# Patient Record
Sex: FEMALE | Race: WHITE | NOT HISPANIC OR LATINO | Employment: FULL TIME | URBAN - METROPOLITAN AREA
[De-identification: names, ages, dates, MRNs, and addresses within clinical notes are randomized per-mention and may not be internally consistent; named-entity substitution may affect disease eponyms.]

---

## 2021-01-29 ENCOUNTER — OFFICE VISIT (OUTPATIENT)
Dept: ENDOCRINOLOGY | Facility: CLINIC | Age: 61
End: 2021-01-29

## 2021-01-29 ENCOUNTER — LAB (OUTPATIENT)
Dept: LAB | Facility: HOSPITAL | Age: 61
End: 2021-01-29

## 2021-01-29 VITALS
HEART RATE: 86 BPM | OXYGEN SATURATION: 98 % | HEIGHT: 63 IN | WEIGHT: 193.2 LBS | SYSTOLIC BLOOD PRESSURE: 128 MMHG | BODY MASS INDEX: 34.23 KG/M2 | DIASTOLIC BLOOD PRESSURE: 62 MMHG

## 2021-01-29 DIAGNOSIS — E78.5 HYPERLIPIDEMIA, UNSPECIFIED HYPERLIPIDEMIA TYPE: ICD-10-CM

## 2021-01-29 DIAGNOSIS — E03.9 HYPOTHYROIDISM, UNSPECIFIED TYPE: Primary | ICD-10-CM

## 2021-01-29 LAB
CHOLEST SERPL-MCNC: 141 MG/DL (ref 0–200)
HDLC SERPL-MCNC: 62 MG/DL (ref 40–60)
LDLC SERPL CALC-MCNC: 66 MG/DL (ref 0–100)
LDLC/HDLC SERPL: 1.06 {RATIO}
T4 FREE SERPL-MCNC: 1.17 NG/DL (ref 0.93–1.7)
TRIGL SERPL-MCNC: 66 MG/DL (ref 0–150)
TSH SERPL DL<=0.05 MIU/L-ACNC: 5.1 UIU/ML (ref 0.27–4.2)
VLDLC SERPL-MCNC: 13 MG/DL (ref 5–40)

## 2021-01-29 PROCEDURE — 84439 ASSAY OF FREE THYROXINE: CPT | Performed by: INTERNAL MEDICINE

## 2021-01-29 PROCEDURE — 99244 OFF/OP CNSLTJ NEW/EST MOD 40: CPT | Performed by: INTERNAL MEDICINE

## 2021-01-29 PROCEDURE — 84443 ASSAY THYROID STIM HORMONE: CPT | Performed by: INTERNAL MEDICINE

## 2021-01-29 PROCEDURE — 80061 LIPID PANEL: CPT | Performed by: INTERNAL MEDICINE

## 2021-01-29 RX ORDER — LEVOTHYROXINE SODIUM 112 UG/1
112 TABLET ORAL DAILY
COMMUNITY
End: 2021-02-03 | Stop reason: SDUPTHER

## 2021-01-29 RX ORDER — LOVASTATIN 40 MG/1
40 TABLET ORAL DAILY
COMMUNITY
Start: 2020-12-13 | End: 2021-02-03 | Stop reason: SDUPTHER

## 2021-01-29 NOTE — PROGRESS NOTES
Chief Complaint   Patient presents with   • Establish Care   • Hypothyroidism        New patient who is being seen in consultation regarding hypothyroidism at the request of Zee Freeman APRN HPI   Fani Sethi is a 60 y.o. female who presents for evaluation of hypothyroidism.    Patient presents today for evaluation of hypothyroidism which was diagnosed 30 years ago when patient presented for routine labs. She does report that she has taken levothyroxine in the past but reports that she felt she had a difference in energy level.  She is open to trying generic again as Synthroid is difficult for her to obtain.  Patient reports that she recently changed to taking thyroid hormone at night approximately 1 month ago.  She denies any missed doses of medication, generally there is about 3 hours between dinner and when she takes her medication.    Patient denies palpiltations, anxiety.  Patient reports changes in bowel habits. Constipation well managed with medications.   Patient reports cold intolerance.  Patient reports changes in weight. Patient reports approximately 15 pounds of weight gain since COVID.   Patient reports psoriasis effecting her skin and nails.  Patient denies tremor.  Patient reports no change in energy level.    Patient denies history of previous head/neck radiation.  Patient denies history of recent iodine exposure.  Patient denies taking OTC supplements such as biotin.  Patient denies personal or family history of thyroid cancer.     Patient also takes lovastatin for hyperlipidemia. She has taken this for approximately 10 years.     Past Medical History:   Diagnosis Date   • Hyperlipidemia    • Hypothyroidism    • Psoriasis      Past Surgical History:   Procedure Laterality Date   • VEIN LACERATION REPAIR        Family History   Problem Relation Age of Onset   • Lung cancer Mother    • Heart disease Sister    • Hypertension Sister       Social History     Socioeconomic History   •  "Marital status:      Spouse name: Not on file   • Number of children: Not on file   • Years of education: Not on file   • Highest education level: Not on file   Tobacco Use   • Smoking status: Never Smoker   • Smokeless tobacco: Never Used   Substance and Sexual Activity   • Drug use: Never   • Sexual activity: Defer      No Known Allergies   Current Outpatient Medications on File Prior to Visit   Medication Sig Dispense Refill   • levothyroxine (SYNTHROID, LEVOTHROID) 112 MCG tablet Take 112 mcg by mouth Daily.     • lovastatin (MEVACOR) 40 MG tablet Take 40 mg by mouth Daily.     • Stool Softener 250 MG capsule Take 1 capsule by mouth Daily.       No current facility-administered medications on file prior to visit.         Review of Systems   Constitutional: Positive for unexpected weight gain. Negative for fatigue.   HENT: Negative for trouble swallowing and voice change.    Eyes: Positive for visual disturbance. Negative for pain.   Respiratory: Negative for cough and shortness of breath.    Cardiovascular: Negative for chest pain and palpitations.   Gastrointestinal: Positive for constipation. Negative for diarrhea.   Endocrine: Positive for cold intolerance. Negative for heat intolerance.   Musculoskeletal: Negative for arthralgias and myalgias.   Skin: Positive for dry skin. Negative for rash.   Neurological: Negative for tremors and weakness.   Hematological: Bruises/bleeds easily.   Psychiatric/Behavioral: Negative for sleep disturbance. The patient is not nervous/anxious.           Vitals:    01/29/21 0948   BP: 128/62   Pulse: 86   SpO2: 98%   Weight: 87.6 kg (193 lb 3.2 oz)   Height: 160 cm (63\")   Body mass index is 34.22 kg/m².     Physical Exam  Vitals signs reviewed.   Constitutional:       General: She is not in acute distress.     Appearance: She is obese.   HENT:      Head: Normocephalic and atraumatic.      Right Ear: Hearing normal.      Left Ear: Hearing normal.      Nose: Nose normal. "   Eyes:      General: Lids are normal.      Conjunctiva/sclera: Conjunctivae normal.   Neck:      Thyroid: No thyromegaly or thyroid tenderness.   Cardiovascular:      Rate and Rhythm: Normal rate and regular rhythm.      Heart sounds: No murmur.   Pulmonary:      Effort: Pulmonary effort is normal.      Breath sounds: Normal breath sounds and air entry.   Abdominal:      General: Abdomen is flat. Bowel sounds are normal.      Palpations: Abdomen is soft.      Tenderness: There is no abdominal tenderness.   Lymphadenopathy:      Head:      Right side of head: No submandibular adenopathy.      Left side of head: No submandibular adenopathy.      Cervical: No cervical adenopathy.   Skin:     General: Skin is warm and dry.      Findings: No rash.   Neurological:      General: No focal deficit present.      Mental Status: She is alert.      Deep Tendon Reflexes: Reflexes are normal and symmetric.   Psychiatric:         Mood and Affect: Mood and affect normal.         Behavior: Behavior is cooperative.        Labs/Imaging  Labs dated 5/20/2020    Triglycerides 85  TSH 5.15      Assessment and Plan    Diagnoses and all orders for this visit:    1. Hypothyroidism, unspecified type (Primary)  -Most recent labs indicative of subclinical hyperthyroidism while taking Synthroid 112 mcg daily  -Patient recently changed timing of her medication administration, discussed that if taking at bedtime I would recommend at least 4 hours between dinner and medication administration  -Patient with symptoms of constipation, weight gain, cold intolerance, reviewed that these may be multifactorial and may not resolve completely even with normalization of thyroid function  -Patient desires trial of transition back to generic thyroid hormone given difficulties obtaining brand Synthroid  -We will plan to repeat thyroid function tests today and adjust levothyroxine as clinically indicated based on labs  -Symptoms of both hypothyroidism  and hyperthyroidism were reviewed with the patient in detail and she was instructed to contact the clinic in the interim between visits with any concerning changes.  -     TSH  -     T4, Free    2. Hyperlipidemia, unspecified hyperlipidemia type  -Patient currently taking lovastatin 40 mg daily  -Repeat lipid panel today  -     Lipid Panel         Return in about 3 months (around 4/29/2021) for Next scheduled follow up. The patient was instructed to contact the clinic with any interval questions or concerns.    Raine Ravi MD     Please note that portions of this document were completed using a voice recognition program. Efforts were made to edit the dictations, but occasionally words are mis-transcribed.

## 2021-02-03 ENCOUNTER — TELEPHONE (OUTPATIENT)
Dept: ENDOCRINOLOGY | Facility: CLINIC | Age: 61
End: 2021-02-03

## 2021-02-03 RX ORDER — LEVOTHYROXINE SODIUM 0.12 MG/1
125 TABLET ORAL DAILY
Qty: 30 TABLET | Refills: 5 | Status: SHIPPED | OUTPATIENT
Start: 2021-02-03 | End: 2021-05-03

## 2021-02-03 RX ORDER — LOVASTATIN 40 MG/1
40 TABLET ORAL DAILY
Qty: 30 TABLET | Refills: 5 | Status: SHIPPED | OUTPATIENT
Start: 2021-02-03 | End: 2021-09-28

## 2021-02-03 NOTE — TELEPHONE ENCOUNTER
PT CALLED STATING THAT SHE WOULD LIKE TO SPEAK WITH SOMEONE IN REGARDS TO HER MOST RECENT LABS. PLEASE REACH OUT TO PT AT EARLIEST CONVENIENCE. THANK YOU

## 2021-03-12 RX ORDER — LOVASTATIN 40 MG/1
40 TABLET ORAL DAILY
Qty: 30 TABLET | Refills: 5 | Status: CANCELLED | OUTPATIENT
Start: 2021-03-12

## 2021-04-30 ENCOUNTER — LAB (OUTPATIENT)
Dept: LAB | Facility: HOSPITAL | Age: 61
End: 2021-04-30

## 2021-04-30 ENCOUNTER — OFFICE VISIT (OUTPATIENT)
Dept: ENDOCRINOLOGY | Facility: CLINIC | Age: 61
End: 2021-04-30

## 2021-04-30 VITALS
WEIGHT: 175 LBS | DIASTOLIC BLOOD PRESSURE: 72 MMHG | HEART RATE: 68 BPM | HEIGHT: 63 IN | BODY MASS INDEX: 31.01 KG/M2 | OXYGEN SATURATION: 97 % | SYSTOLIC BLOOD PRESSURE: 120 MMHG

## 2021-04-30 DIAGNOSIS — E03.9 HYPOTHYROIDISM, UNSPECIFIED TYPE: Primary | ICD-10-CM

## 2021-04-30 DIAGNOSIS — E78.5 HYPERLIPIDEMIA, UNSPECIFIED HYPERLIPIDEMIA TYPE: ICD-10-CM

## 2021-04-30 LAB
ALBUMIN SERPL-MCNC: 4.4 G/DL (ref 3.5–5.2)
ALBUMIN/GLOB SERPL: 1.5 G/DL
ALP SERPL-CCNC: 63 U/L (ref 39–117)
ALT SERPL W P-5'-P-CCNC: 14 U/L (ref 1–33)
ANION GAP SERPL CALCULATED.3IONS-SCNC: 11.8 MMOL/L (ref 5–15)
AST SERPL-CCNC: 21 U/L (ref 1–32)
BILIRUB SERPL-MCNC: 0.6 MG/DL (ref 0–1.2)
BUN SERPL-MCNC: 8 MG/DL (ref 8–23)
BUN/CREAT SERPL: 12.1 (ref 7–25)
CALCIUM SPEC-SCNC: 9.6 MG/DL (ref 8.6–10.5)
CHLORIDE SERPL-SCNC: 103 MMOL/L (ref 98–107)
CO2 SERPL-SCNC: 26.2 MMOL/L (ref 22–29)
CREAT SERPL-MCNC: 0.66 MG/DL (ref 0.57–1)
GFR SERPL CREATININE-BSD FRML MDRD: 91 ML/MIN/1.73
GLOBULIN UR ELPH-MCNC: 2.9 GM/DL
GLUCOSE SERPL-MCNC: 71 MG/DL (ref 65–99)
POTASSIUM SERPL-SCNC: 3.8 MMOL/L (ref 3.5–5.2)
PROT SERPL-MCNC: 7.3 G/DL (ref 6–8.5)
SODIUM SERPL-SCNC: 141 MMOL/L (ref 136–145)
T4 FREE SERPL-MCNC: 1.8 NG/DL (ref 0.93–1.7)
TSH SERPL DL<=0.05 MIU/L-ACNC: 0.11 UIU/ML (ref 0.27–4.2)

## 2021-04-30 PROCEDURE — 80053 COMPREHEN METABOLIC PANEL: CPT | Performed by: INTERNAL MEDICINE

## 2021-04-30 PROCEDURE — 84439 ASSAY OF FREE THYROXINE: CPT | Performed by: INTERNAL MEDICINE

## 2021-04-30 PROCEDURE — 99214 OFFICE O/P EST MOD 30 MIN: CPT | Performed by: INTERNAL MEDICINE

## 2021-04-30 PROCEDURE — 84443 ASSAY THYROID STIM HORMONE: CPT | Performed by: INTERNAL MEDICINE

## 2021-04-30 NOTE — PROGRESS NOTES
"Chief Complaint   Patient presents with   • Follow-up   • Hypothyroidism        HPI   Fani Garrido is a 60 y.o. female had concerns including Follow-up and Hypothyroidism.     Patient reports he has been doing generally well in the interim since her last visit.  She does report that she started intermittent fasting and has had significant weight loss, almost 20 pounds.  She feels generally well.  She denies any issues since changing her dose of levothyroxine, specifically no changes in bowel habits, heat or cold intolerance.  She denies any missed doses.    Patient continues on lovastatin 40 mg for hyperlipidemia.  She denies any myalgias or abdominal pain.    The following portions of the patient's history were reviewed and updated as appropriate: allergies, current medications and past social history.    Review of Systems   Constitutional: Negative for fatigue and unexpected weight gain.   Cardiovascular: Negative for palpitations.   Gastrointestinal: Negative for constipation and diarrhea.      /72   Pulse 68   Ht 160 cm (63\")   Wt 79.4 kg (175 lb)   SpO2 97%   BMI 31.00 kg/m²      Physical Exam      Constitutional:  well developed; well nourished  no acute distress  appears stated age   ENT/Thyroid: not examined   Eyes: Conjunctiva: clear   Respiratory:  breathing is unlabored  clear to auscultation bilaterally   Cardiovascular:  regular rate and rhythm   Chest:  Not performed.   Abdomen: soft, non-tender; no masses   : Not performed.   Musculoskeletal: Not performed   Skin: dry and warm   Neuro: mental status, speech normal   Psych: mood and affect are within normal limits       Labs/Imaging  Results for FANI GARRIDO (MRN [id number]) as of 4/30/2021 09:31   Ref. Range 1/29/2021 10:44   TSH Baseline Latest Ref Range: 0.270 - 4.200 uIU/mL 5.100 (H)   Free T4 Latest Ref Range: 0.93 - 1.70 ng/dL 1.17   Total Cholesterol Latest Ref Range: 0 - 200 mg/dL 141   HDL Cholesterol Latest Ref " Range: 40 - 60 mg/dL 62 (H)   LDL Cholesterol  Latest Ref Range: 0 - 100 mg/dL 66   VLDL Cholesterol Latest Ref Range: 5 - 40 mg/dL 13   Triglycerides Latest Ref Range: 0 - 150 mg/dL 66   LDL/HDL Ratio Unknown 1.06       Diagnoses and all orders for this visit:    1. Hypothyroidism, unspecified type (Primary)  -Levothyroxine dose increased to 125 mcg daily at last visit given slight elevation of TSH  -Patient is clinically euthyroid, she has lost approximately 20 pounds.  Discussed potential need for dose change in the setting of weight changes.  -Repeat thyroid function testing today and adjust levothyroxine as clinically indicated  -Symptoms of both hypothyroidism and hyperthyroidism were reviewed with patient in detail and she was instructed to contact clinic in the interim between visits with any concerning changes.  -     TSH  -     T4, Free    2. Hyperlipidemia, unspecified hyperlipidemia type  -Lipids at goal following last visit, continue lovastatin 40 mg  -Update CMP today  -     Comprehensive Metabolic Panel    Discussed with patient that if no dose changes required, may space out follow-up to 6 months  Return in about 3 months (around 7/30/2021) for Next scheduled follow up. The patient was instructed to contact the clinic with any interval questions or concerns.    Raine Ravi MD   Endocrinologist    Please note that portions of this document were completed with a voice recognition program. Efforts were made to edit the dictations, but occasionally words are mis-transcribed.

## 2021-05-03 RX ORDER — LEVOTHYROXINE SODIUM 112 UG/1
112 TABLET ORAL DAILY
Qty: 30 TABLET | Refills: 5 | Status: SHIPPED | OUTPATIENT
Start: 2021-05-03 | End: 2021-08-14

## 2021-08-13 ENCOUNTER — OFFICE VISIT (OUTPATIENT)
Dept: ENDOCRINOLOGY | Facility: CLINIC | Age: 61
End: 2021-08-13

## 2021-08-13 ENCOUNTER — LAB (OUTPATIENT)
Dept: LAB | Facility: HOSPITAL | Age: 61
End: 2021-08-13

## 2021-08-13 VITALS
HEART RATE: 54 BPM | SYSTOLIC BLOOD PRESSURE: 128 MMHG | HEIGHT: 63 IN | OXYGEN SATURATION: 96 % | BODY MASS INDEX: 29.06 KG/M2 | DIASTOLIC BLOOD PRESSURE: 72 MMHG | WEIGHT: 164 LBS

## 2021-08-13 DIAGNOSIS — E78.5 HYPERLIPIDEMIA, UNSPECIFIED HYPERLIPIDEMIA TYPE: ICD-10-CM

## 2021-08-13 DIAGNOSIS — E03.9 HYPOTHYROIDISM, UNSPECIFIED TYPE: Primary | ICD-10-CM

## 2021-08-13 LAB
T4 FREE SERPL-MCNC: 1.77 NG/DL (ref 0.93–1.7)
TSH SERPL DL<=0.05 MIU/L-ACNC: 0.07 UIU/ML (ref 0.27–4.2)

## 2021-08-13 PROCEDURE — 99214 OFFICE O/P EST MOD 30 MIN: CPT | Performed by: INTERNAL MEDICINE

## 2021-08-13 PROCEDURE — 84439 ASSAY OF FREE THYROXINE: CPT | Performed by: INTERNAL MEDICINE

## 2021-08-13 PROCEDURE — 84443 ASSAY THYROID STIM HORMONE: CPT | Performed by: INTERNAL MEDICINE

## 2021-08-13 NOTE — PROGRESS NOTES
"Chief Complaint   Patient presents with   • Hypothyroidism     follow up         HPI   Fani Garrido is a 61 y.o. female had concerns including Hypothyroidism (follow up ).      Patient reports he has been doing generally well in the interim since her last visit.  She continues on levothyroxine 112 mcg daily, dose reduced following last visit secondary to iatrogenic hyperthyroidism.  Patient continues to lose weight.  She attributes weight loss to a combination of exercise as well as intermittent fasting.  She denies any changes in bowel habits, palpitations, heat or cold intolerance.    Patient continues on lovastatin 40 mg daily for hyperlipidemia.  She denies any myalgias or abdominal pain.    Patient agreeable to repeat labs today.    The following portions of the patient's history were reviewed and updated as appropriate: allergies, current medications and past social history.    Review of Systems   Constitutional: Negative for fatigue.   Cardiovascular: Negative for palpitations.   Gastrointestinal: Negative for constipation and diarrhea.   Endocrine: Negative for cold intolerance and heat intolerance.   Musculoskeletal: Negative for myalgias.      /72   Pulse 54   Ht 160 cm (63\")   Wt 74.4 kg (164 lb)   SpO2 96%   BMI 29.05 kg/m²      Physical Exam      Constitutional:  well developed; well nourished  no acute distress   ENT/Thyroid: not examined   Eyes: Conjunctiva: clear   Respiratory:  breathing is unlabored  clear to auscultation bilaterally   Cardiovascular:  regular rate and rhythm   Chest:  Not performed.   Abdomen: soft, non-tender; no masses   : Not performed.   Musculoskeletal: Not performed   Skin: dry and warm   Neuro: mental status, speech normal   Psych: mood and affect are within normal limits       Labs/Imaging  Results for FANI GARRIDO (MRN [id number]) as of 8/13/2021 09:45   Ref. Range 4/30/2021 09:53   TSH Baseline Latest Ref Range: 0.270 - 4.200 uIU/mL 0.108 (L) "   Free T4 Latest Ref Range: 0.93 - 1.70 ng/dL 1.80 (H)     Diagnoses and all orders for this visit:    1. Hypothyroidism, unspecified type (Primary)  -Required dose reduction following last visit due to iatrogenic hyperthyroidism  -Patient currently taking levothyroxine 112 mcg daily  -Patient is clinically euthyroid  -Discussed potential for continued reduction in dose given weight loss  -We will review labs today and adjust as clinically indicated  -Symptoms of both hypothyroidism and hyperthyroidism were reviewed with patient in detail patient was advised to contact the clinic in the interim between visits with any concerning changes.  -     TSH  -     T4, Free    2. Hyperlipidemia, unspecified hyperlipidemia type  -Most recent lipid panel at goal, continue lovastatin 40 mg daily    Return in about 4 months (around 12/13/2021). The patient was instructed to contact the clinic with any interval questions or concerns.    Raine Ravi MD   Endocrinologist    Please note that portions of this document were completed with a voice recognition program. Efforts were made to edit the dictations, but occasionally words are mis-transcribed.

## 2021-08-14 RX ORDER — LEVOTHYROXINE SODIUM 88 UG/1
88 TABLET ORAL DAILY
Qty: 30 TABLET | Refills: 5 | Status: SHIPPED | OUTPATIENT
Start: 2021-08-14 | End: 2021-12-21 | Stop reason: SDUPTHER

## 2021-09-28 RX ORDER — LOVASTATIN 40 MG/1
TABLET ORAL
Qty: 90 TABLET | Refills: 0 | Status: SHIPPED | OUTPATIENT
Start: 2021-09-28 | End: 2021-12-21 | Stop reason: SDUPTHER

## 2021-12-17 ENCOUNTER — OFFICE VISIT (OUTPATIENT)
Dept: ENDOCRINOLOGY | Facility: CLINIC | Age: 61
End: 2021-12-17

## 2021-12-17 ENCOUNTER — LAB (OUTPATIENT)
Dept: LAB | Facility: HOSPITAL | Age: 61
End: 2021-12-17

## 2021-12-17 VITALS
BODY MASS INDEX: 28 KG/M2 | WEIGHT: 158 LBS | SYSTOLIC BLOOD PRESSURE: 124 MMHG | OXYGEN SATURATION: 97 % | HEART RATE: 66 BPM | DIASTOLIC BLOOD PRESSURE: 70 MMHG | HEIGHT: 63 IN

## 2021-12-17 DIAGNOSIS — E03.9 HYPOTHYROIDISM, UNSPECIFIED TYPE: Primary | ICD-10-CM

## 2021-12-17 DIAGNOSIS — E78.5 HYPERLIPIDEMIA, UNSPECIFIED HYPERLIPIDEMIA TYPE: ICD-10-CM

## 2021-12-17 LAB
ALBUMIN SERPL-MCNC: 4.6 G/DL (ref 3.5–5.2)
ALBUMIN/GLOB SERPL: 1.6 G/DL
ALP SERPL-CCNC: 72 U/L (ref 39–117)
ALT SERPL W P-5'-P-CCNC: 11 U/L (ref 1–33)
ANION GAP SERPL CALCULATED.3IONS-SCNC: 7.5 MMOL/L (ref 5–15)
AST SERPL-CCNC: 25 U/L (ref 1–32)
BILIRUB SERPL-MCNC: 0.8 MG/DL (ref 0–1.2)
BUN SERPL-MCNC: 7 MG/DL (ref 8–23)
BUN/CREAT SERPL: 9 (ref 7–25)
CALCIUM SPEC-SCNC: 9.7 MG/DL (ref 8.6–10.5)
CHLORIDE SERPL-SCNC: 104 MMOL/L (ref 98–107)
CHOLEST SERPL-MCNC: 162 MG/DL (ref 0–200)
CO2 SERPL-SCNC: 27.5 MMOL/L (ref 22–29)
CREAT SERPL-MCNC: 0.78 MG/DL (ref 0.57–1)
GFR SERPL CREATININE-BSD FRML MDRD: 75 ML/MIN/1.73
GLOBULIN UR ELPH-MCNC: 2.8 GM/DL
GLUCOSE SERPL-MCNC: 83 MG/DL (ref 65–99)
HDLC SERPL-MCNC: 66 MG/DL (ref 40–60)
LDLC SERPL CALC-MCNC: 82 MG/DL (ref 0–100)
LDLC/HDLC SERPL: 1.24 {RATIO}
POTASSIUM SERPL-SCNC: 4.3 MMOL/L (ref 3.5–5.2)
PROT SERPL-MCNC: 7.4 G/DL (ref 6–8.5)
SODIUM SERPL-SCNC: 139 MMOL/L (ref 136–145)
T4 FREE SERPL-MCNC: 1.34 NG/DL (ref 0.93–1.7)
TRIGL SERPL-MCNC: 72 MG/DL (ref 0–150)
TSH SERPL DL<=0.05 MIU/L-ACNC: 3.5 UIU/ML (ref 0.27–4.2)
VLDLC SERPL-MCNC: 14 MG/DL (ref 5–40)

## 2021-12-17 PROCEDURE — 84443 ASSAY THYROID STIM HORMONE: CPT | Performed by: INTERNAL MEDICINE

## 2021-12-17 PROCEDURE — 84439 ASSAY OF FREE THYROXINE: CPT | Performed by: INTERNAL MEDICINE

## 2021-12-17 PROCEDURE — 99214 OFFICE O/P EST MOD 30 MIN: CPT | Performed by: INTERNAL MEDICINE

## 2021-12-17 PROCEDURE — 80061 LIPID PANEL: CPT | Performed by: INTERNAL MEDICINE

## 2021-12-17 PROCEDURE — 80053 COMPREHEN METABOLIC PANEL: CPT | Performed by: INTERNAL MEDICINE

## 2021-12-17 NOTE — PROGRESS NOTES
"Chief Complaint   Patient presents with   • Hypothyroidism        HPI   Fani Garrido is a 61 y.o. female had concerns including Hypothyroidism.      Patient denies any significant health changes in the interim since her last visit. For hypothyroidism, she is taking 88 mcg of levothyroxine daily, dose was reduced following last labs. She denies any palpitations, diarrhea, heat intolerance. She denies any missed doses of medication.    For hyperlipidemia, patient continues on lovastatin 40 mg daily. She denies any myalgias or abdominal pain.    The following portions of the patient's history were reviewed and updated as appropriate: allergies, current medications and past social history.    Review of Systems   Cardiovascular: Negative for palpitations.   Gastrointestinal: Negative for abdominal pain, constipation and diarrhea.   Endocrine: Negative for cold intolerance and heat intolerance.   Musculoskeletal: Negative for myalgias.      /70 (BP Location: Right arm, Patient Position: Sitting, Cuff Size: Adult)   Pulse 66   Ht 160 cm (63\")   Wt 71.7 kg (158 lb)   SpO2 97%   BMI 27.99 kg/m²      Physical Exam      Constitutional:  well developed; well nourished  no acute distress   ENT/Thyroid: not examined   Eyes: Conjunctiva: clear   Respiratory:  breathing is unlabored  clear to auscultation bilaterally   Cardiovascular:  regular rate and rhythm   Chest:  Not performed.   Abdomen: soft, non-tender; no masses   : Not performed.   Musculoskeletal: Not performed   Skin: dry and warm   Neuro: mental status, speech normal   Psych: mood and affect are within normal limits       Labs/Imaging  Results for FANI GARRIDO (MRN [id number]) as of 12/17/2021 10:53   Ref. Range 8/13/2021 09:57   TSH Baseline Latest Ref Range: 0.270 - 4.200 uIU/mL 0.067 (L)   Free T4 Latest Ref Range: 0.93 - 1.70 ng/dL 1.77 (H)       Diagnoses and all orders for this visit:    1. Hypothyroidism, unspecified type " (Primary)  -Dose reduced following last visit due to iatrogenic hyperthyroidism, currently taking 88 mcg daily  -Plan to repeat labs today and adjust as clinically indicated  -Appropriate administration of thyroid hormone was reviewed  -     TSH  -     T4, Free    2. Hyperlipidemia, unspecified hyperlipidemia type  -Currently taking lovastatin 40 mg daily  -Repeat labs today  -     Comprehensive Metabolic Panel  -     Lipid Panel         Return in about 4 months (around 4/17/2022) for Next scheduled follow up. The patient was instructed to contact the clinic with any interval questions or concerns.    Raine Ravi MD   Endocrinologist    Dictated Utilizing Dragon Dictation

## 2021-12-21 RX ORDER — LEVOTHYROXINE SODIUM 88 UG/1
88 TABLET ORAL DAILY
Qty: 90 TABLET | Refills: 1 | Status: SHIPPED | OUTPATIENT
Start: 2021-12-21 | End: 2022-04-21

## 2021-12-21 RX ORDER — LOVASTATIN 40 MG/1
40 TABLET ORAL DAILY
Qty: 90 TABLET | Refills: 1 | Status: SHIPPED | OUTPATIENT
Start: 2021-12-21 | End: 2022-04-21

## 2022-04-18 ENCOUNTER — OFFICE VISIT (OUTPATIENT)
Dept: ENDOCRINOLOGY | Facility: CLINIC | Age: 62
End: 2022-04-18

## 2022-04-18 ENCOUNTER — LAB (OUTPATIENT)
Dept: LAB | Facility: HOSPITAL | Age: 62
End: 2022-04-18

## 2022-04-18 VITALS
HEART RATE: 86 BPM | WEIGHT: 160 LBS | OXYGEN SATURATION: 95 % | HEIGHT: 63 IN | DIASTOLIC BLOOD PRESSURE: 66 MMHG | SYSTOLIC BLOOD PRESSURE: 124 MMHG | BODY MASS INDEX: 28.35 KG/M2

## 2022-04-18 DIAGNOSIS — E78.5 HYPERLIPIDEMIA, UNSPECIFIED HYPERLIPIDEMIA TYPE: ICD-10-CM

## 2022-04-18 DIAGNOSIS — E03.9 HYPOTHYROIDISM, UNSPECIFIED TYPE: Primary | ICD-10-CM

## 2022-04-18 PROCEDURE — 84439 ASSAY OF FREE THYROXINE: CPT | Performed by: INTERNAL MEDICINE

## 2022-04-18 PROCEDURE — 84443 ASSAY THYROID STIM HORMONE: CPT | Performed by: INTERNAL MEDICINE

## 2022-04-18 PROCEDURE — 99214 OFFICE O/P EST MOD 30 MIN: CPT | Performed by: INTERNAL MEDICINE

## 2022-04-18 NOTE — PROGRESS NOTES
"Chief Complaint   Patient presents with   • Hypothyroidism        HPI   Fani Sethi is a 61 y.o. female had concerns including Hypothyroidism.      Patient reports he has been doing generally well in the interim since her last visit.  No medication changes were made following last visit.  Patient continues on levothyroxine 88 mcg daily for hypothyroidism.  She denies significant weight changes, changes in bowel habits.  She denies any missed doses of medication.  She is agreeable to repeat labs today.    Patient continues on lovastatin 40 mg daily for hyperlipidemia.  She denies myalgias or abdominal pain.    The following portions of the patient's history were reviewed and updated as appropriate: allergies, current medications and past social history.    Review of Systems   Constitutional: Negative for unexpected weight gain and unexpected weight loss.   Cardiovascular: Negative for palpitations.   Gastrointestinal: Negative for abdominal pain.   Endocrine: Negative for cold intolerance and heat intolerance.   Musculoskeletal: Negative for myalgias.      /66 (BP Location: Left arm, Patient Position: Sitting, Cuff Size: Adult)   Pulse 86   Ht 160 cm (63\")   Wt 72.6 kg (160 lb)   SpO2 95%   BMI 28.34 kg/m²      Physical Exam      Constitutional:  well developed; well nourished  no acute distress  appears stated age   ENT/Thyroid: no thyromegaly   Eyes: Conjunctiva: clear   Respiratory:  breathing is unlabored  clear to auscultation bilaterally   Cardiovascular:  regular rate and rhythm   Chest:  Not performed.   Abdomen: soft, non-tender; no masses   : Not performed.   Musculoskeletal: Not performed   Skin: not performed.   Neuro: mental status, speech normal   Psych: mood and affect are within normal limits       Labs/Imaging   Latest Reference Range & Units 12/17/21 09:56   TSH Baseline 0.270 - 4.200 uIU/mL 3.500   Free T4 0.93 - 1.70 ng/dL 1.34   Total Cholesterol 0 - 200 mg/dL 162   HDL " Cholesterol 40 - 60 mg/dL 66 (H)   LDL Cholesterol  0 - 100 mg/dL 82   VLDL Cholesterol 5 - 40 mg/dL 14   Triglycerides 0 - 150 mg/dL 72   LDL/HDL Ratio  1.24   (H): Data is abnormally high    Diagnoses and all orders for this visit:    1. Hypothyroidism, unspecified type (Primary)  -Currently taking levothyroxine 88 mcg daily  -Clinically euthyroid  -Appropriate administration of thyroid hormone reviewed  -Repeat labs today to ensure stability  -Symptoms of thyroid hormone dysregulation reviewed, patient will contact the clinic in the interim between visits with any concerning changes.  -     TSH  -     T4, Free    2. Hyperlipidemia, unspecified hyperlipidemia type  -Most recent lipid panel at goal  -Continue lovastatin 40 mg daily      Return in about 1 year (around 4/18/2023) for Next scheduled follow up. The patient was instructed to contact the clinic with any interval questions or concerns.    Raine Ravi MD   Endocrinologist    Dictated Utilizing Dragon Dictation

## 2022-04-19 LAB
T4 FREE SERPL-MCNC: 1.49 NG/DL (ref 0.93–1.7)
TSH SERPL DL<=0.05 MIU/L-ACNC: 2.24 UIU/ML (ref 0.27–4.2)

## 2022-04-21 RX ORDER — LEVOTHYROXINE SODIUM 88 UG/1
88 TABLET ORAL DAILY
Qty: 90 TABLET | Refills: 3 | Status: SHIPPED | OUTPATIENT
Start: 2022-04-21

## 2022-04-21 RX ORDER — LOVASTATIN 40 MG/1
40 TABLET ORAL DAILY
Qty: 90 TABLET | Refills: 3 | Status: SHIPPED | OUTPATIENT
Start: 2022-04-21

## 2023-09-13 ENCOUNTER — OFFICE VISIT (OUTPATIENT)
Dept: ENDOCRINOLOGY | Facility: CLINIC | Age: 63
End: 2023-09-13
Payer: MEDICARE

## 2023-09-13 VITALS
OXYGEN SATURATION: 94 % | HEIGHT: 63 IN | DIASTOLIC BLOOD PRESSURE: 68 MMHG | HEART RATE: 80 BPM | SYSTOLIC BLOOD PRESSURE: 126 MMHG | WEIGHT: 183 LBS | BODY MASS INDEX: 32.43 KG/M2

## 2023-09-13 DIAGNOSIS — E78.5 HYPERLIPIDEMIA, UNSPECIFIED HYPERLIPIDEMIA TYPE: ICD-10-CM

## 2023-09-13 DIAGNOSIS — E03.9 HYPOTHYROIDISM, UNSPECIFIED TYPE: Primary | ICD-10-CM

## 2023-09-13 LAB
ALBUMIN SERPL-MCNC: 4.6 G/DL (ref 3.5–5.2)
ALBUMIN/GLOB SERPL: 1.5 G/DL
ALP SERPL-CCNC: 68 U/L (ref 39–117)
ALT SERPL W P-5'-P-CCNC: 14 U/L (ref 1–33)
ANION GAP SERPL CALCULATED.3IONS-SCNC: 12.7 MMOL/L (ref 5–15)
AST SERPL-CCNC: 21 U/L (ref 1–32)
BILIRUB SERPL-MCNC: 1.1 MG/DL (ref 0–1.2)
BUN SERPL-MCNC: 9 MG/DL (ref 8–23)
BUN/CREAT SERPL: 11.8 (ref 7–25)
CALCIUM SPEC-SCNC: 9.6 MG/DL (ref 8.6–10.5)
CHLORIDE SERPL-SCNC: 104 MMOL/L (ref 98–107)
CHOLEST SERPL-MCNC: 159 MG/DL (ref 0–200)
CO2 SERPL-SCNC: 24.3 MMOL/L (ref 22–29)
CREAT SERPL-MCNC: 0.76 MG/DL (ref 0.57–1)
EGFRCR SERPLBLD CKD-EPI 2021: 87.1 ML/MIN/1.73
GLOBULIN UR ELPH-MCNC: 3 GM/DL
GLUCOSE SERPL-MCNC: 82 MG/DL (ref 65–99)
HDLC SERPL-MCNC: 69 MG/DL (ref 40–60)
LDLC SERPL CALC-MCNC: 79 MG/DL (ref 0–100)
LDLC/HDLC SERPL: 1.16 {RATIO}
POTASSIUM SERPL-SCNC: 4 MMOL/L (ref 3.5–5.2)
PROT SERPL-MCNC: 7.6 G/DL (ref 6–8.5)
SODIUM SERPL-SCNC: 141 MMOL/L (ref 136–145)
T4 FREE SERPL-MCNC: 1.42 NG/DL (ref 0.93–1.7)
TRIGL SERPL-MCNC: 51 MG/DL (ref 0–150)
TSH SERPL DL<=0.05 MIU/L-ACNC: 6.11 UIU/ML (ref 0.27–4.2)
VLDLC SERPL-MCNC: 11 MG/DL (ref 5–40)

## 2023-09-13 PROCEDURE — 80053 COMPREHEN METABOLIC PANEL: CPT | Performed by: INTERNAL MEDICINE

## 2023-09-13 PROCEDURE — 84443 ASSAY THYROID STIM HORMONE: CPT | Performed by: INTERNAL MEDICINE

## 2023-09-13 PROCEDURE — 84439 ASSAY OF FREE THYROXINE: CPT | Performed by: INTERNAL MEDICINE

## 2023-09-13 PROCEDURE — 80061 LIPID PANEL: CPT | Performed by: INTERNAL MEDICINE

## 2023-09-13 PROCEDURE — 36415 COLL VENOUS BLD VENIPUNCTURE: CPT | Performed by: INTERNAL MEDICINE

## 2023-09-13 PROCEDURE — 99214 OFFICE O/P EST MOD 30 MIN: CPT | Performed by: INTERNAL MEDICINE

## 2023-09-13 NOTE — PROGRESS NOTES
"Chief Complaint   Patient presents with    Hypothyroidism        HPI   Fani Sethi is a 63 y.o. female had concerns including Hypothyroidism.      Patient reports that she retired in the interim since last visit.  She does report some weight gain since prison.  She does do her best to remain active.  She continues on levothyroxine 88 mcg daily for hypothyroidism.  She denies missed doses of this medication.    Patient does report occasional missed doses of lovastatin.  She denies any adverse effect of this medication.  She is agreeable to updating labs today.    The following portions of the patient's history were reviewed and updated as appropriate: allergies, current medications, and past social history.    Review of Systems   Endocrine: Negative for polydipsia and polyuria.   Musculoskeletal:  Negative for myalgias.      /68 (BP Location: Left arm, Patient Position: Sitting, Cuff Size: Adult)   Pulse 80   Ht 160 cm (63\")   Wt 83 kg (183 lb)   SpO2 94%   BMI 32.42 kg/m²      Physical Exam      Constitutional:  well developed; well nourished  no acute distress   ENT/Thyroid: not examined   Eyes: Conjunctiva: clear   Respiratory:  breathing is unlabored  clear to auscultation bilaterally   Cardiovascular:  regular rate and rhythm   Chest:  Not performed.   Abdomen: Not performed.   : Not performed.   Musculoskeletal: Not performed   Skin: not performed.   Neuro: mental status, speech normal   Psych: mood and affect are within normal limits       Labs/Imaging   Latest Reference Range & Units 12/17/21 09:56 04/18/22 10:08   Sodium 136 - 145 mmol/L 139    Potassium 3.5 - 5.2 mmol/L 4.3    Chloride 98 - 107 mmol/L 104    CO2 22.0 - 29.0 mmol/L 27.5    Anion Gap 5.0 - 15.0 mmol/L 7.5    BUN 8 - 23 mg/dL 7 (L)    Creatinine 0.57 - 1.00 mg/dL 0.78    BUN/Creatinine Ratio 7.0 - 25.0  9.0    Glucose 65 - 99 mg/dL 83    Calcium 8.6 - 10.5 mg/dL 9.7    Alkaline Phosphatase 39 - 117 U/L 72    Total " Protein 6.0 - 8.5 g/dL 7.4    Albumin 3.50 - 5.20 g/dL 4.60    Globulin gm/dL 2.8    A/G Ratio g/dL 1.6    AST (SGOT) 1 - 32 U/L 25    ALT (SGPT) 1 - 33 U/L 11    Total Bilirubin 0.0 - 1.2 mg/dL 0.8    eGFR Non African Am >60 mL/min/1.73 75    TSH Baseline 0.270 - 4.200 uIU/mL 3.500 2.240   Free T4 0.93 - 1.70 ng/dL 1.34 1.49   Total Cholesterol 0 - 200 mg/dL 162    HDL Cholesterol 40 - 60 mg/dL 66 (H)    LDL Cholesterol  0 - 100 mg/dL 82    VLDL Cholesterol 5 - 40 mg/dL 14    Triglycerides 0 - 150 mg/dL 72    LDL/HDL Ratio  1.24    (L): Data is abnormally low  (H): Data is abnormally high    Diagnoses and all orders for this visit:    1. Hypothyroidism, unspecified type (Primary)  -     Lipid Panel; Future  -     Comprehensive Metabolic Panel; Future  -     TSH; Future  -     T4, Free; Future  Most recent thyroid function testing at goal, patient due for updated labs.  We did discuss potential for change in thyroid hormone requirements with weight change.  Patient's had interval weight gain, otherwise clinically euthyroid  Plan to update labs today and adjust medication as clinically indicated    2. Hyperlipidemia, unspecified hyperlipidemia type  Update lipid panel and CMP today.  Continue lovastatin 40 mg daily     Return in about 1 year (around 9/13/2024) for Next scheduled follow up. The patient was instructed to contact the clinic with any interval questions or concerns.    Raine aRvi MD   Endocrinologist    Dictated Utilizing Dragon Dictation

## 2023-09-15 ENCOUNTER — TELEPHONE (OUTPATIENT)
Dept: ENDOCRINOLOGY | Facility: CLINIC | Age: 63
End: 2023-09-15
Payer: MEDICARE

## 2023-09-15 RX ORDER — LEVOTHYROXINE SODIUM 0.1 MG/1
100 TABLET ORAL DAILY
Qty: 30 TABLET | Refills: 5 | Status: SHIPPED | OUTPATIENT
Start: 2023-09-15

## 2023-09-15 NOTE — TELEPHONE ENCOUNTER
PATIENT WOULD LIKE A CALL BACK TO DISCUSS LAB RESULTS AND TO SEE IF WE ARE CHANGING MEDICATIONS. PHONE NUMBER [phone]

## 2023-10-10 RX ORDER — LOVASTATIN 40 MG/1
TABLET ORAL
Qty: 90 TABLET | Refills: 3 | Status: SHIPPED | OUTPATIENT
Start: 2023-10-10

## 2023-10-10 NOTE — TELEPHONE ENCOUNTER
Rx Refill Note  Requested Prescriptions     Pending Prescriptions Disp Refills    lovastatin (MEVACOR) 40 MG tablet [Pharmacy Med Name: Lovastatin 40 MG Oral Tablet] 90 tablet 0     Sig: Take 1 tablet by mouth once daily      Last office visit with prescribing clinician: 9/13/2023     Next office visit with prescribing clinician: 9/17/2024

## 2023-12-14 ENCOUNTER — LAB (OUTPATIENT)
Dept: ENDOCRINOLOGY | Facility: CLINIC | Age: 63
End: 2023-12-14
Payer: MEDICARE

## 2023-12-14 DIAGNOSIS — E03.9 HYPOTHYROIDISM, UNSPECIFIED TYPE: ICD-10-CM

## 2023-12-14 LAB
T4 FREE SERPL-MCNC: 1.58 NG/DL (ref 0.93–1.7)
TSH SERPL DL<=0.05 MIU/L-ACNC: 2 UIU/ML (ref 0.27–4.2)

## 2023-12-14 PROCEDURE — 36415 COLL VENOUS BLD VENIPUNCTURE: CPT | Performed by: INTERNAL MEDICINE

## 2023-12-14 RX ORDER — LEVOTHYROXINE SODIUM 0.1 MG/1
100 TABLET ORAL DAILY
Qty: 30 TABLET | Refills: 5 | Status: SHIPPED | OUTPATIENT
Start: 2023-12-14

## 2024-07-05 RX ORDER — LEVOTHYROXINE SODIUM 0.1 MG/1
100 TABLET ORAL DAILY
Qty: 60 TABLET | Refills: 0 | Status: SHIPPED | OUTPATIENT
Start: 2024-07-05

## 2024-09-09 RX ORDER — LEVOTHYROXINE SODIUM 100 UG/1
100 TABLET ORAL DAILY
Qty: 30 TABLET | Refills: 0 | Status: SHIPPED | OUTPATIENT
Start: 2024-09-09

## 2024-09-09 NOTE — TELEPHONE ENCOUNTER
Caller: Fani Sethi    Relationship: Self    Best call back number: [phone]    Requested Prescriptions:   Requested Prescriptions     Pending Prescriptions Disp Refills    levothyroxine (SYNTHROID, LEVOTHROID) 100 MCG tablet 60 tablet 0     Sig: Take 1 tablet by mouth Daily.        Pharmacy where request should be sent: [facility] [id number] - [phone]  - [phone]     Last office visit with prescribing clinician: 9/13/2023   Last telemedicine visit with prescribing clinician: Visit date not found   Next office visit with prescribing clinician: 9/24/2024     Additional details provided by patient: PATIENT WILL RUN OUT OF MEDICATION BEFORE F/U.     Does the patient have less than a 3 day supply:  [x] Yes  [] No    Would you like a call back once the refill request has been completed: [] Yes [x] No    Segundo Mariano Rep   09/09/24 14:23 EDT

## 2024-09-09 NOTE — TELEPHONE ENCOUNTER
Rx Refill Note  Requested Prescriptions     Pending Prescriptions Disp Refills    levothyroxine (SYNTHROID, LEVOTHROID) 100 MCG tablet 60 tablet 0     Sig: Take 1 tablet by mouth Daily.      Last office visit with prescribing clinician: 9/13/2023   Last telemedicine visit with prescribing clinician: Visit date not found   Next office visit with prescribing clinician: 9/24/2024                         Would you like a call back once the refill request has been completed: [] Yes [] No    If the office needs to give you a call back, can they leave a voicemail: [] Yes [] No    Kari Lion MA  09/09/24, 16:26 EDT

## 2024-09-24 ENCOUNTER — OFFICE VISIT (OUTPATIENT)
Dept: ENDOCRINOLOGY | Facility: CLINIC | Age: 64
End: 2024-09-24
Payer: MEDICARE

## 2024-09-24 VITALS
RESPIRATION RATE: 16 BRPM | DIASTOLIC BLOOD PRESSURE: 88 MMHG | HEART RATE: 92 BPM | WEIGHT: 187 LBS | SYSTOLIC BLOOD PRESSURE: 138 MMHG | OXYGEN SATURATION: 96 % | BODY MASS INDEX: 33.13 KG/M2

## 2024-09-24 DIAGNOSIS — E03.9 HYPOTHYROIDISM, UNSPECIFIED TYPE: Primary | ICD-10-CM

## 2024-09-24 DIAGNOSIS — E78.5 HYPERLIPIDEMIA, UNSPECIFIED HYPERLIPIDEMIA TYPE: ICD-10-CM

## 2024-09-24 LAB
ALBUMIN SERPL-MCNC: 4.7 G/DL (ref 3.5–5.2)
ALBUMIN/GLOB SERPL: 1.8 G/DL
ALP SERPL-CCNC: 86 U/L (ref 39–117)
ALT SERPL W P-5'-P-CCNC: 15 U/L (ref 1–33)
ANION GAP SERPL CALCULATED.3IONS-SCNC: 10 MMOL/L (ref 5–15)
AST SERPL-CCNC: 20 U/L (ref 1–32)
BILIRUB SERPL-MCNC: 0.8 MG/DL (ref 0–1.2)
BUN SERPL-MCNC: 12 MG/DL (ref 8–23)
BUN/CREAT SERPL: 16.2 (ref 7–25)
CALCIUM SPEC-SCNC: 9.8 MG/DL (ref 8.6–10.5)
CHLORIDE SERPL-SCNC: 101 MMOL/L (ref 98–107)
CHOLEST SERPL-MCNC: 157 MG/DL (ref 0–200)
CO2 SERPL-SCNC: 27 MMOL/L (ref 22–29)
CREAT SERPL-MCNC: 0.74 MG/DL (ref 0.57–1)
EGFRCR SERPLBLD CKD-EPI 2021: 89.4 ML/MIN/1.73
GLOBULIN UR ELPH-MCNC: 2.6 GM/DL
GLUCOSE SERPL-MCNC: 95 MG/DL (ref 65–99)
HDLC SERPL-MCNC: 63 MG/DL (ref 40–60)
LDLC SERPL CALC-MCNC: 81 MG/DL (ref 0–100)
LDLC/HDLC SERPL: 1.28 {RATIO}
POTASSIUM SERPL-SCNC: 4.1 MMOL/L (ref 3.5–5.2)
PROT SERPL-MCNC: 7.3 G/DL (ref 6–8.5)
SODIUM SERPL-SCNC: 138 MMOL/L (ref 136–145)
T4 FREE SERPL-MCNC: 1.53 NG/DL (ref 0.92–1.68)
TRIGL SERPL-MCNC: 66 MG/DL (ref 0–150)
TSH SERPL DL<=0.05 MIU/L-ACNC: 1.84 UIU/ML (ref 0.27–4.2)
VLDLC SERPL-MCNC: 13 MG/DL (ref 5–40)

## 2024-09-24 PROCEDURE — 99214 OFFICE O/P EST MOD 30 MIN: CPT | Performed by: INTERNAL MEDICINE

## 2024-09-24 PROCEDURE — 84439 ASSAY OF FREE THYROXINE: CPT | Performed by: INTERNAL MEDICINE

## 2024-09-24 PROCEDURE — 80061 LIPID PANEL: CPT | Performed by: INTERNAL MEDICINE

## 2024-09-24 PROCEDURE — 84443 ASSAY THYROID STIM HORMONE: CPT | Performed by: INTERNAL MEDICINE

## 2024-09-24 PROCEDURE — 80053 COMPREHEN METABOLIC PANEL: CPT | Performed by: INTERNAL MEDICINE

## 2024-09-25 RX ORDER — LEVOTHYROXINE SODIUM 112 UG/1
112 TABLET ORAL DAILY
Qty: 90 TABLET | Refills: 1 | Status: SHIPPED | OUTPATIENT
Start: 2024-09-25

## 2024-10-03 RX ORDER — LOVASTATIN 40 MG
40 TABLET ORAL DAILY
Qty: 90 TABLET | Refills: 1 | Status: SHIPPED | OUTPATIENT
Start: 2024-10-03

## 2024-10-03 NOTE — TELEPHONE ENCOUNTER
Rx Refill Note    Requested Prescriptions     Pending Prescriptions Disp Refills    lovastatin (MEVACOR) 40 MG tablet [Pharmacy Med Name: LOVASTATIN 40 MG TABLET] 90 tablet 3     Sig: TAKE 1 TABLET BY MOUTH DAILY        Last office visit with prescribing clinician: 9/24/2024       Next office visit with prescribing clinician: 9/24/2025   {

## 2024-10-09 RX ORDER — LEVOTHYROXINE SODIUM 100 UG/1
100 TABLET ORAL DAILY
Qty: 30 TABLET | Refills: 0 | OUTPATIENT
Start: 2024-10-09

## 2024-12-19 ENCOUNTER — LAB (OUTPATIENT)
Dept: ENDOCRINOLOGY | Facility: CLINIC | Age: 64
End: 2024-12-19
Payer: MEDICARE

## 2024-12-19 DIAGNOSIS — E03.9 HYPOTHYROIDISM, UNSPECIFIED TYPE: ICD-10-CM

## 2024-12-19 LAB
T4 FREE SERPL-MCNC: 1.57 NG/DL (ref 0.92–1.68)
TSH SERPL DL<=0.05 MIU/L-ACNC: 0.75 UIU/ML (ref 0.27–4.2)

## 2024-12-19 PROCEDURE — 36415 COLL VENOUS BLD VENIPUNCTURE: CPT | Performed by: INTERNAL MEDICINE

## 2024-12-19 RX ORDER — LEVOTHYROXINE SODIUM 112 UG/1
112 TABLET ORAL DAILY
Qty: 90 TABLET | Refills: 1 | Status: SHIPPED | OUTPATIENT
Start: 2024-12-19

## 2024-12-19 RX ORDER — LEVOTHYROXINE SODIUM 112 UG/1
112 TABLET ORAL DAILY
Qty: 90 TABLET | Refills: 1 | Status: CANCELLED | OUTPATIENT
Start: 2024-12-19

## 2024-12-19 NOTE — TELEPHONE ENCOUNTER
Pt called prescription was sent to [facility] pharmacy for Levothyroxine 112 mcg. Please send to [facility] pharmacy

## 2025-01-03 RX ORDER — LEVOTHYROXINE SODIUM 112 UG/1
112 TABLET ORAL DAILY
Qty: 90 TABLET | Refills: 1 | Status: SHIPPED | OUTPATIENT
Start: 2025-01-03

## 2025-05-14 RX ORDER — LOVASTATIN 40 MG/1
40 TABLET ORAL DAILY
Qty: 90 TABLET | Refills: 1 | Status: SHIPPED | OUTPATIENT
Start: 2025-05-14

## 2025-05-14 NOTE — TELEPHONE ENCOUNTER
Rx Refill Note  Requested Prescriptions     Pending Prescriptions Disp Refills    lovastatin (MEVACOR) 40 MG tablet [Pharmacy Med Name: LOVASTATIN 40 MG TABLET] 90 tablet 1     Sig: TAKE 1 TABLET BY MOUTH DAILY      Last office visit with prescribing clinician: 9/24/2024     Next office visit with prescribing clinician: 9/24/2025     Sylvie Azul MA  05/14/25, 08:29 EDT

## 2025-08-26 RX ORDER — LEVOTHYROXINE SODIUM 112 UG/1
112 TABLET ORAL DAILY
Qty: 30 TABLET | Refills: 0 | Status: SHIPPED | OUTPATIENT
Start: 2025-08-26